# Patient Record
Sex: FEMALE | Race: WHITE | Employment: UNEMPLOYED | URBAN - NONMETROPOLITAN AREA
[De-identification: names, ages, dates, MRNs, and addresses within clinical notes are randomized per-mention and may not be internally consistent; named-entity substitution may affect disease eponyms.]

---

## 2017-01-09 ENCOUNTER — HOSPITAL ENCOUNTER (OUTPATIENT)
Dept: CT IMAGING | Age: 47
Discharge: HOME | End: 2017-01-09
Payer: MEDICAID

## 2017-01-09 DIAGNOSIS — R10.84 GENERALIZED ABDOMINAL PAIN: ICD-10-CM

## 2017-01-09 DIAGNOSIS — K43.2 RECURRENT VENTRAL HERNIA: ICD-10-CM

## 2017-01-09 PROCEDURE — 74176 CT ABD & PELVIS W/O CONTRAST: CPT

## 2017-01-12 ENCOUNTER — TELEPHONE (OUTPATIENT)
Dept: SURGERY | Age: 47
End: 2017-01-12

## 2017-03-13 ENCOUNTER — HOSPITAL ENCOUNTER (OUTPATIENT)
Dept: HOSPITAL 58 - LAB | Age: 47
Discharge: HOME | End: 2017-03-13
Attending: PHYSICIAN ASSISTANT

## 2017-03-13 DIAGNOSIS — R35.0: Primary | ICD-10-CM

## 2017-03-13 LAB
ALBUMIN SERPL-MCNC: 3.6 G/DL (ref 3.4–5)
ALBUMIN/GLOB SERPL: 1.09 {RATIO}
ALP SERPL-CCNC: 83 U/L (ref 42–98)
ALT SERPL-CCNC: 28 U/L (ref 12–78)
ANION GAP SERPL CALC-SCNC: 12.1 MMOL/L
AST SERPL-CCNC: 21 U/L (ref 15–37)
BILIRUB SERPL-MCNC: 0.45 MG/DL (ref 0–1.2)
BUN SERPL-MCNC: 11 MG/DL (ref 7–18)
BUN/CREAT SERPL: 13.25
CALCIUM SERPL-MCNC: 9.8 MG/DL (ref 8.2–10.2)
CHLORIDE SERPL-SCNC: 100 MMOL/L (ref 98–107)
CO2 BLD-SCNC: 35 MMOL/L (ref 21–32)
CREAT SERPL-MCNC: 0.83 MG/DL (ref 0.6–1.3)
GFR SERPLBLD BASED ON 1.73 SQ M-ARVRAT: 74 ML/MIN
GLOBULIN SER CALC-MCNC: 3.3 G/L
GLUCOSE SERPL-MCNC: 100 MG/DL (ref 70–110)
POTASSIUM SERPL-SCNC: 4.1 MMOL/L (ref 3.5–5.1)
PROT SERPL-MCNC: 6.9 G/DL (ref 6.4–8.2)
SODIUM SERPL-SCNC: 143 MMOL/L (ref 136–145)

## 2017-03-13 PROCEDURE — 36415 COLL VENOUS BLD VENIPUNCTURE: CPT

## 2017-03-13 PROCEDURE — 80053 COMPREHEN METABOLIC PANEL: CPT

## 2017-03-14 ENCOUNTER — HOSPITAL ENCOUNTER (OUTPATIENT)
Dept: HOSPITAL 58 - RAD | Age: 47
Discharge: HOME | End: 2017-03-14
Attending: PHYSICIAN ASSISTANT

## 2017-03-14 DIAGNOSIS — R35.0: Primary | ICD-10-CM

## 2017-03-14 LAB
BASOPHILS # BLD AUTO: 0.1 K/UL (ref 0–0.2)
BASOPHILS NFR BLD AUTO: 0.7 % (ref 0–3)
CHOLEST SERPL-MCNC: 196 MG/DL (ref 0–200)
CHOLEST/HDLC SERPL: 4.8 {RATIO} (ref 4.5–5.5)
EOSINOPHIL # BLD AUTO: 0.3 K/UL (ref 0–0.7)
EOSINOPHIL NFR BLD AUTO: 4.3 % (ref 0–7)
HCT VFR BLD AUTO: 41 % (ref 37–47)
HDLC SERPL-MCNC: 41 MG/DL (ref 35–80)
HGB BLD-MCNC: 13.7 G/DL (ref 12–16)
IMM GRANULOCYTES NFR BLD AUTO: 0.3 % (ref 0–5)
LYMPHOCYTES # SPEC AUTO: 2.1 K/UL (ref 0.6–3.4)
LYMPHOCYTES NFR BLD AUTO: 29.4 % (ref 10–50)
MCH RBC QN: 28.5 PG (ref 27–31)
MCHC RBC AUTO-ENTMCNC: 33.4 G/DL (ref 31.8–35.4)
MCV RBC: 85.2 FL (ref 81–99)
MONOCYTES # BLD AUTO: 0.5 K/UL (ref 0.4–2)
MONOCYTES NFR BLD AUTO: 6.5 % (ref 0–10)
NEUTROPHILS # BLD AUTO: 4.2 K/UL (ref 2–6.9)
NEUTROPHILS NFR BLD AUTO: 58.8 %
PLATELET # BLD AUTO: 210 10^3/UL (ref 140–440)
RBC # BLD AUTO: 4.81 10^6/UL (ref 4.2–5.4)
TRIGL SERPL-MCNC: 220 MG/DL (ref 30–150)
VLDLC SERPL CALC-MCNC: 44 MG/DL (ref 2–30)
WBC # BLD AUTO: 7.2 K/UL (ref 4.6–10.2)

## 2017-03-14 PROCEDURE — 83036 HEMOGLOBIN GLYCOSYLATED A1C: CPT

## 2017-03-14 PROCEDURE — 84439 ASSAY OF FREE THYROXINE: CPT

## 2017-03-14 PROCEDURE — 80061 LIPID PANEL: CPT

## 2017-03-14 PROCEDURE — 36415 COLL VENOUS BLD VENIPUNCTURE: CPT

## 2017-03-14 PROCEDURE — 85025 COMPLETE CBC W/AUTO DIFF WBC: CPT

## 2017-03-14 PROCEDURE — 84443 ASSAY THYROID STIM HORMONE: CPT

## 2017-03-14 NOTE — CT
EXAM:  CT ABDOMEN AND PELVIS 

  

HISTORY:  Frequent urination, left upper quadrant abdominal pain, left flank pain. 

  

TECHNIQUE:  CT abdomen and pelvis with and without intravenous contrast.  Images were reconstructed 
using 5 mm section thickness.  Reformations were prepared. 75 mL Omnipaque. 

COMPARISON:  05/13/2016 

  

FINDINGS: 

  

Right hepatic lobe measures 20 cm.  Diffuse low attenuation of the liver parenchyma without focal le
bandar.  Spleen within normal limits.  Gallbladder is absent.  Pancreas and adrenal glands appear norm
al.  No hydronephrosis or nephrolithiasis.  Tiny cyst of the left renal cortex.  The inferior left k
idney has a 1.6 cm cystic mass with CT characteristics most consistent with a simple cyst and this i
s grossly stable in size since at least 01/22/2014.  No hydronephrosis, nephrolithiasis or evidence 
of ureteral obstruction.  No perinephric fat stranding.  Abdominal aorta has minimal atherosclerosis
 within normal caliber. 

  

Stomach is unremarkable.  Appendix has been removed.  There is a newly developed ventral abdominal w
all hernia located supraumbilical to the left of midline containing a short segment of the lateral t
ransverse colon without obstruction or wall thickening. The neck of the hernia is about 3.4 cm axial
 dimension.  No pneumatosis.  Bowel gas pattern remains within normal limits.  Prominent size of the
 uterus without well-defined fibroids.  Urinary bladder is within normal limits.  No ascites. 

  

No acute bony finding.  Lung bases are clear.  No pneumoperitoneum. 

  

  

IMPRESSION: 

1.  Enlarged uterus without well-defined fibroid masses.  Urinary bladder proper is within normal li
mits although some mass effect on the organ may be caused by the enlarged uterus. 

2.  Recurrent ventral hernia as described in the second paragraph of the report.  Bowel gas pattern 
remains within normal limits.  No obvious bowel strangulation. 

3.  Enlarged fatty liver. 

4.  Probable bilateral renal cortical cysts.

## 2017-04-03 ENCOUNTER — HOSPITAL ENCOUNTER (OUTPATIENT)
Dept: HOSPITAL 58 - RAD | Age: 47
Discharge: HOME | End: 2017-04-03
Attending: PHYSICIAN ASSISTANT

## 2017-04-03 DIAGNOSIS — R60.0: Primary | ICD-10-CM

## 2017-04-03 NOTE — US
EXAM:  Bilateral lower extremity venous doppler. 

  

HISTORY:  Bilateral lower extremity pain and swelling. 

  

COMPARISON:  02/26/2016. 

  

TECHNIQUE:  Multiple grayscale and color doppler images were obtained. 

  

FINDINGS:  There is normal flow, compressibility and augmentation of flow within the right and left 
common femoral, greater saphenous, profunda, femoral, popliteal, posterior tibial, anterior tibial a
nd peroneal veins. 

  

----------------- 

IMPRESSION: 

No evidence for right or left lower extremity deep vein thrombosis at the levels examined.

## 2018-03-05 ENCOUNTER — HOSPITAL ENCOUNTER (OUTPATIENT)
Dept: HOSPITAL 58 - RAD | Age: 48
Discharge: HOME | End: 2018-03-05
Attending: PHYSICIAN ASSISTANT

## 2018-03-05 DIAGNOSIS — K43.2: Primary | ICD-10-CM

## 2018-03-05 DIAGNOSIS — M54.5: ICD-10-CM

## 2018-03-05 PROCEDURE — 36415 COLL VENOUS BLD VENIPUNCTURE: CPT

## 2018-03-05 PROCEDURE — 82565 ASSAY OF CREATININE: CPT

## 2018-03-05 NOTE — US
EXAM:  Limited abdominal ultrasound. 

  

History:  Recurrent incisional ventral hernia. 

  

Comparison:  CT abdomen pelvis 03/14/2017 

  

Technique:  Multiple sonographic images through the abdomen were obtained.  Color duplex Doppler was 
used to interrogate vascular flow. 

  

Findings / impression: Within the anterior abdomen to the left of midline, there is shadowing bowel w
hich is protruding but difficult to determine if this has broken through the fascia and is a true her
arsalan.  Recommend correlation with CT.

## 2018-04-18 RX ORDER — ERGOCALCIFEROL 1.25 MG/1
50000 CAPSULE ORAL WEEKLY
COMMUNITY

## 2018-04-18 RX ORDER — LAMOTRIGINE 25 MG/1
TABLET ORAL
Refills: 2 | COMMUNITY
Start: 2018-04-01

## 2018-04-18 RX ORDER — ALPRAZOLAM 1 MG/1
TABLET ORAL
Refills: 3 | COMMUNITY
Start: 2018-04-02

## 2018-04-18 RX ORDER — NAPROXEN 500 MG/1
TABLET ORAL
Refills: 0 | COMMUNITY
Start: 2018-02-28

## 2018-04-18 RX ORDER — CYCLOBENZAPRINE HCL 5 MG
TABLET ORAL
Refills: 1 | COMMUNITY
Start: 2018-03-05

## 2018-04-18 RX ORDER — LISINOPRIL 5 MG/1
TABLET ORAL DAILY
Refills: 1 | COMMUNITY
Start: 2018-02-16

## 2018-04-18 RX ORDER — OMEPRAZOLE 20 MG/1
CAPSULE, DELAYED RELEASE ORAL
Refills: 4 | COMMUNITY
Start: 2018-03-20

## 2018-04-30 ENCOUNTER — TELEPHONE (OUTPATIENT)
Dept: VASCULAR SURGERY | Facility: CLINIC | Age: 48
End: 2018-04-30

## 2018-04-30 NOTE — TELEPHONE ENCOUNTER
Left message reminding Ms Samayoa of her appointment tomorrow at 930 am with Dr Espinosa and advised if she had any questions or needed to reschedule to please call the office at [phone].

## 2018-05-08 ENCOUNTER — HOSPITAL ENCOUNTER (OUTPATIENT)
Dept: HOSPITAL 58 - RAD | Age: 48
Discharge: HOME | End: 2018-05-08
Attending: PHYSICIAN ASSISTANT

## 2018-05-08 DIAGNOSIS — R10.9: Primary | ICD-10-CM

## 2018-05-08 PROCEDURE — 82565 ASSAY OF CREATININE: CPT

## 2018-05-08 PROCEDURE — 36415 COLL VENOUS BLD VENIPUNCTURE: CPT

## 2018-05-08 NOTE — CT
EXAM:  CT of the abdomen pelvis with and without contrast 

  

History:  Bilateral flank pain. 

  

Comparison:  CT abdomen pelvis 03/14/2017 

  

Technique:  Multiplanar CT images through the abdomen pelvis were obtained with and without the admin
istration of IV contrast 

  

Findings: Lung bases are free of consolidation.  No acute osseous abnormalities. 

  

No renal stones and no hydronephrosis.  No ureteral calculi. Status post cholecystectomy.  Enlarged f
atty liver.  No peripancreatic inflammation.  No focal liver or splenic lesions.  No pancreatic lesio
ns.  Adrenal glands are unremarkable.  Stable small renal cysts.  Left anterior ventral hernia contai
romario a loop of transverse colon has increased and size compared to the prior study.  There is no evid
ence for bowel obstruction.  Anterior abdominal wall subcutaneous edema and scarring similar to the p
rior study.  No bladder wall thickening.  Adnexal structures appear appropriate for patient's age.  N
o perirectal inflammation.  No free air.  No ascites.  Postsurgical changes again seen within the ant
erior abdomen probably from previous hernia repair. 

  

Impression: 

1.  No acute intra-abdominal or pelvic process. 

2.  Increasing size of left ventral hernia containing a loop of transverse colon.  There is no eviden
ce for bowel obstruction at this time. 

3.  No change in the anterior abdominal wall scarring/cellulitis. 

4.  Enlarged fatty liver.

## 2019-01-11 ENCOUNTER — HOSPITAL ENCOUNTER (OUTPATIENT)
Dept: HOSPITAL 58 - RAD | Age: 49
Discharge: HOME | End: 2019-01-11
Attending: NURSE PRACTITIONER

## 2019-01-11 DIAGNOSIS — M54.6: Primary | ICD-10-CM

## 2019-01-11 NOTE — DI
Exam:  Three views of the thoracic spine. 

  

Comparison:  CT performed 04/23/2012. 

  

Reason for exam:  Pain. 

  

  

FINDINGS:  No acute fracture or malalignment.  There is multilevel degenerative disease with interver
tebral body disc space height narrowing and osteophyte formation.  The superiormost portion of the th
oracic spine is not well seen secondary to summation artifact from the shoulders. 

  

Impression: 

  

No acute fracture or listhesis in the thoracic spine with multilevel degenerative disease.